# Patient Record
Sex: FEMALE | Race: WHITE | Employment: FULL TIME | URBAN - METROPOLITAN AREA
[De-identification: names, ages, dates, MRNs, and addresses within clinical notes are randomized per-mention and may not be internally consistent; named-entity substitution may affect disease eponyms.]

---

## 2017-04-27 PROBLEM — M67.431 GANGLION CYST OF DORSUM OF RIGHT WRIST: Status: ACTIVE | Noted: 2017-04-27

## 2017-05-03 PROCEDURE — 88304 TISSUE EXAM BY PATHOLOGIST: CPT | Performed by: ORTHOPAEDIC SURGERY

## 2018-08-28 NOTE — PROGRESS NOTES
HPI:   Drew Freitas is a 26year old female here to discuss weight gain and foot pain     On on mobic for chronic right wrist surgery; pt sees dr. Joy Fuller.     Pt reports she had gradual weight gain since 2015- 80-90lbs  Pt has not had labs for thyroi denies depression or anxiety  HEMATOLOGIC: denies hx of anemia  ENDOCRINE: denies thyroid history  ALL/ASTHMA: denies  asthma    EXAM:   /84   Pulse 99   Temp 98.2 °F (36.8 °C) (Oral)   Resp 18   Ht 66\"   Wt 221 lb   LMP 08/23/2018   SpO2 95%   Mary Ann (CPT=[id number]); Future    Questions answered and patient indicates understanding of these issues and agrees to the plan. Follow up in 2-3 months or sooner if needed.

## 2018-08-29 ENCOUNTER — LAB ENCOUNTER (OUTPATIENT)
Dept: LAB | Age: 23
End: 2018-08-29
Attending: FAMILY MEDICINE
Payer: COMMERCIAL

## 2018-08-29 ENCOUNTER — OFFICE VISIT (OUTPATIENT)
Dept: FAMILY MEDICINE CLINIC | Facility: CLINIC | Age: 23
End: 2018-08-29
Payer: COMMERCIAL

## 2018-08-29 ENCOUNTER — HOSPITAL ENCOUNTER (OUTPATIENT)
Dept: GENERAL RADIOLOGY | Age: 23
Discharge: HOME | End: 2018-08-29
Attending: FAMILY MEDICINE
Payer: COMMERCIAL

## 2018-08-29 ENCOUNTER — TELEPHONE (OUTPATIENT)
Dept: FAMILY MEDICINE CLINIC | Facility: CLINIC | Age: 23
End: 2018-08-29

## 2018-08-29 VITALS
HEIGHT: 66 IN | OXYGEN SATURATION: 95 % | WEIGHT: 221 LBS | RESPIRATION RATE: 18 BRPM | TEMPERATURE: 98 F | HEART RATE: 99 BPM | SYSTOLIC BLOOD PRESSURE: 120 MMHG | BODY MASS INDEX: 35.52 KG/M2 | DIASTOLIC BLOOD PRESSURE: 84 MMHG

## 2018-08-29 DIAGNOSIS — M79.672 LEFT FOOT PAIN: ICD-10-CM

## 2018-08-29 DIAGNOSIS — Z00.00 GENERAL MEDICAL EXAM: ICD-10-CM

## 2018-08-29 DIAGNOSIS — R63.5 WEIGHT GAIN: ICD-10-CM

## 2018-08-29 DIAGNOSIS — Z00.00 GENERAL MEDICAL EXAM: Primary | ICD-10-CM

## 2018-08-29 LAB
ALBUMIN SERPL-MCNC: 3.8 G/DL (ref 3.5–4.8)
ALBUMIN/GLOB SERPL: 1.1 {RATIO} (ref 1–2)
ALP LIVER SERPL-CCNC: 85 U/L (ref 52–144)
ALT SERPL-CCNC: 22 U/L (ref 14–54)
ANION GAP SERPL CALC-SCNC: 7 MMOL/L (ref 0–18)
AST SERPL-CCNC: 18 U/L (ref 15–41)
BASOPHILS # BLD AUTO: 0.04 X10(3) UL (ref 0–0.1)
BASOPHILS NFR BLD AUTO: 0.6 %
BILIRUB SERPL-MCNC: 0.5 MG/DL (ref 0.1–2)
BUN BLD-MCNC: 15 MG/DL (ref 8–20)
BUN/CREAT SERPL: 17.2 (ref 10–20)
CALCIUM BLD-MCNC: 9 MG/DL (ref 8.3–10.3)
CHLORIDE SERPL-SCNC: 107 MMOL/L (ref 101–111)
CHOLEST SMN-MCNC: 149 MG/DL (ref ?–200)
CO2 SERPL-SCNC: 27 MMOL/L (ref 22–32)
CREAT BLD-MCNC: 0.87 MG/DL (ref 0.55–1.02)
EOSINOPHIL # BLD AUTO: 0.14 X10(3) UL (ref 0–0.3)
EOSINOPHIL NFR BLD AUTO: 2.2 %
ERYTHROCYTE [DISTWIDTH] IN BLOOD BY AUTOMATED COUNT: 11.9 % (ref 11.5–16)
EST. AVERAGE GLUCOSE BLD GHB EST-MCNC: 100 MG/DL (ref 68–126)
GLOBULIN PLAS-MCNC: 3.5 G/DL (ref 2.5–4)
GLUCOSE BLD-MCNC: 93 MG/DL (ref 70–99)
HAV AB SERPL IA-ACNC: 356 PG/ML (ref 193–986)
HBA1C MFR BLD HPLC: 5.1 % (ref ?–5.7)
HCT VFR BLD AUTO: 41.1 % (ref 34–50)
HDLC SERPL-MCNC: 46 MG/DL (ref 40–59)
HGB BLD-MCNC: 13.5 G/DL (ref 12–16)
IMMATURE GRANULOCYTE COUNT: 0.02 X10(3) UL (ref 0–1)
IMMATURE GRANULOCYTE RATIO %: 0.3 %
LDLC SERPL CALC-MCNC: 88 MG/DL (ref ?–100)
LYMPHOCYTES # BLD AUTO: 1.76 X10(3) UL (ref 0.9–4)
LYMPHOCYTES NFR BLD AUTO: 27.4 %
M PROTEIN MFR SERPL ELPH: 7.3 G/DL (ref 6.1–8.3)
MCH RBC QN AUTO: 29.2 PG (ref 27–33.2)
MCHC RBC AUTO-ENTMCNC: 32.8 G/DL (ref 31–37)
MCV RBC AUTO: 89 FL (ref 81–100)
MONOCYTES # BLD AUTO: 0.55 X10(3) UL (ref 0.1–1)
MONOCYTES NFR BLD AUTO: 8.6 %
NEUTROPHIL ABS PRELIM: 3.92 X10 (3) UL (ref 1.3–6.7)
NEUTROPHILS # BLD AUTO: 3.92 X10(3) UL (ref 1.3–6.7)
NEUTROPHILS NFR BLD AUTO: 60.9 %
NONHDLC SERPL-MCNC: 103 MG/DL (ref ?–130)
OSMOLALITY SERPL CALC.SUM OF ELEC: 293 MOSM/KG (ref 275–295)
PLATELET # BLD AUTO: 269 10(3)UL (ref 150–450)
POTASSIUM SERPL-SCNC: 3.7 MMOL/L (ref 3.6–5.1)
RBC # BLD AUTO: 4.62 X10(6)UL (ref 3.8–5.1)
RED CELL DISTRIBUTION WIDTH-SD: 38.2 FL (ref 35.1–46.3)
SODIUM SERPL-SCNC: 141 MMOL/L (ref 136–144)
T4 FREE SERPL-MCNC: 1.2 NG/DL (ref 0.9–1.8)
TRIGL SERPL-MCNC: 73 MG/DL (ref 30–149)
TSI SER-ACNC: 1.48 MIU/ML (ref 0.35–5.5)
VIT D+METAB SERPL-MCNC: 11.5 NG/ML (ref 30–100)
VLDLC SERPL CALC-MCNC: 15 MG/DL (ref 0–30)
WBC # BLD AUTO: 6.4 X10(3) UL (ref 4–13)

## 2018-08-29 PROCEDURE — 80053 COMPREHEN METABOLIC PANEL: CPT

## 2018-08-29 PROCEDURE — 84443 ASSAY THYROID STIM HORMONE: CPT

## 2018-08-29 PROCEDURE — 80061 LIPID PANEL: CPT

## 2018-08-29 PROCEDURE — 83036 HEMOGLOBIN GLYCOSYLATED A1C: CPT

## 2018-08-29 PROCEDURE — 36415 COLL VENOUS BLD VENIPUNCTURE: CPT

## 2018-08-29 PROCEDURE — 84439 ASSAY OF FREE THYROXINE: CPT

## 2018-08-29 PROCEDURE — 82306 VITAMIN D 25 HYDROXY: CPT

## 2018-08-29 PROCEDURE — 73630 X-RAY EXAM OF FOOT: CPT | Performed by: FAMILY MEDICINE

## 2018-08-29 PROCEDURE — 85025 COMPLETE CBC W/AUTO DIFF WBC: CPT

## 2018-08-29 PROCEDURE — 99203 OFFICE O/P NEW LOW 30 MIN: CPT | Performed by: FAMILY MEDICINE

## 2018-08-29 PROCEDURE — 82607 VITAMIN B-12: CPT

## 2018-08-29 NOTE — TELEPHONE ENCOUNTER
----- Message from Jefferson Banerjee DO sent at 8/29/2018 10:10 AM CDT -----  Normal x-ray   Ok for referral to [address] [place] [address],9D

## 2018-09-05 ENCOUNTER — OFFICE VISIT (OUTPATIENT)
Dept: FAMILY MEDICINE CLINIC | Facility: CLINIC | Age: 23
End: 2018-09-05
Payer: COMMERCIAL

## 2018-09-05 VITALS
OXYGEN SATURATION: 98 % | SYSTOLIC BLOOD PRESSURE: 118 MMHG | HEART RATE: 90 BPM | TEMPERATURE: 99 F | RESPIRATION RATE: 18 BRPM | DIASTOLIC BLOOD PRESSURE: 78 MMHG | WEIGHT: 221 LBS | HEIGHT: 66 IN | BODY MASS INDEX: 35.52 KG/M2

## 2018-09-05 DIAGNOSIS — J02.9 SORE THROAT: ICD-10-CM

## 2018-09-05 DIAGNOSIS — H66.91 ACUTE INFECTION OF RIGHT EAR: Primary | ICD-10-CM

## 2018-09-05 LAB
CONTROL LINE PRESENT WITH A CLEAR BACKGROUND (YES/NO): YES YES/NO
STREP GRP A CUL-SCR: NEGATIVE

## 2018-09-05 PROCEDURE — 87880 STREP A ASSAY W/OPTIC: CPT | Performed by: PHYSICIAN ASSISTANT

## 2018-09-05 PROCEDURE — 99213 OFFICE O/P EST LOW 20 MIN: CPT | Performed by: PHYSICIAN ASSISTANT

## 2018-09-05 RX ORDER — AMOXICILLIN AND CLAVULANATE POTASSIUM 875; 125 MG/1; MG/1
1 TABLET, FILM COATED ORAL 2 TIMES DAILY
Qty: 20 TABLET | Refills: 0 | Status: SHIPPED | OUTPATIENT
Start: 2018-09-05 | End: 2018-09-15

## 2018-09-05 NOTE — PROGRESS NOTES
HPI:   Lux Purvis is a 26year old female who presents for right ear pain and sore throat for  2  days. Patient reports sore throat, ear pain, denies fever, denies cough, denies sinus pain. Sore throat has resolved.    Has taken ibuprofen and sudafe normal S1S2, RRR without murmur     ASSESSMENT AND PLAN:   1. Sore throat  Rapid strep negative  Ibuprofen 600 mg every 6 hours with food as needed for pain  - STREP A ASSAY W/OPTIC    2. Acute infection of right ear  augmentin twice daily for 10 days.  Li Causey

## 2020-08-25 PROCEDURE — 88304 TISSUE EXAM BY PATHOLOGIST: CPT | Performed by: ORTHOPAEDIC SURGERY

## 2020-09-16 ENCOUNTER — TELEMEDICINE (OUTPATIENT)
Dept: FAMILY MEDICINE CLINIC | Facility: CLINIC | Age: 25
End: 2020-09-16

## 2020-09-16 DIAGNOSIS — L30.0 NUMMULAR ECZEMA: Primary | ICD-10-CM

## 2020-09-16 PROCEDURE — 99213 OFFICE O/P EST LOW 20 MIN: CPT | Performed by: FAMILY MEDICINE

## 2020-09-16 NOTE — PROGRESS NOTES
HPI:    Patient ID: Mele Karus is a 26year old female. Rash   This is a new problem. Episode onset: 1.5 weeks ago. The problem has been gradually worsening since onset. The affected locations include the left arm.  The rash is characterized by i External Cream 60 g 0     Sig: Apply topically 2 (two) times daily as needed.        Imaging & Referrals:  None       LO#[id number]

## 2020-11-03 ENCOUNTER — TELEMEDICINE (OUTPATIENT)
Dept: FAMILY MEDICINE CLINIC | Facility: CLINIC | Age: 25
End: 2020-11-03
Payer: COMMERCIAL

## 2020-11-03 DIAGNOSIS — R50.9 FEVER, UNSPECIFIED FEVER CAUSE: Primary | ICD-10-CM

## 2020-11-03 DIAGNOSIS — R06.00 DYSPNEA, UNSPECIFIED TYPE: ICD-10-CM

## 2020-11-03 PROCEDURE — 99213 OFFICE O/P EST LOW 20 MIN: CPT | Performed by: INTERNAL MEDICINE

## 2020-11-03 RX ORDER — PREDNISONE 10 MG/1
TABLET ORAL
Qty: 12 TABLET | Refills: 0 | Status: SHIPPED | OUTPATIENT
Start: 2020-11-03 | End: 2022-01-06

## 2020-11-03 RX ORDER — AZITHROMYCIN 250 MG/1
TABLET, FILM COATED ORAL
Qty: 6 TABLET | Refills: 0 | Status: SHIPPED | OUTPATIENT
Start: 2020-11-03 | End: 2020-11-08

## 2020-11-03 NOTE — PROGRESS NOTES
Virtual/Telephone Check-In    Layton Felix verbally consents to a Virtual/Telephone Check-In service on 11/3/2020  .   Patient understands and accepts financial responsibility for any deductible, co-insurance and/or co-pays associated with this servic pleasant demeanor      ASSESSMENT AND PLAN:   Viral Syndrome  PLAN: Zithromycin z-deven as directed for 5 days and prednisone x 4 days, Covid test.  Isolation precautions discussed.   Drink plenty of clear fluids and get as much rest as possible while you're

## 2020-11-04 ENCOUNTER — APPOINTMENT (OUTPATIENT)
Dept: LAB | Age: 25
End: 2020-11-04
Attending: INTERNAL MEDICINE
Payer: COMMERCIAL

## 2020-11-04 DIAGNOSIS — R50.9 FEVER, UNSPECIFIED FEVER CAUSE: ICD-10-CM

## 2020-11-04 DIAGNOSIS — R06.00 DYSPNEA, UNSPECIFIED TYPE: ICD-10-CM

## 2020-11-09 ENCOUNTER — TELEMEDICINE (OUTPATIENT)
Dept: FAMILY MEDICINE CLINIC | Facility: CLINIC | Age: 25
End: 2020-11-09

## 2020-11-09 DIAGNOSIS — J01.00 ACUTE NON-RECURRENT MAXILLARY SINUSITIS: ICD-10-CM

## 2020-11-09 DIAGNOSIS — U07.1 COVID-19: Primary | ICD-10-CM

## 2020-11-09 PROCEDURE — 99213 OFFICE O/P EST LOW 20 MIN: CPT | Performed by: INTERNAL MEDICINE

## 2020-11-09 RX ORDER — AZITHROMYCIN 250 MG/1
TABLET, FILM COATED ORAL
Qty: 6 TABLET | Refills: 0 | Status: SHIPPED | OUTPATIENT
Start: 2020-11-09 | End: 2020-11-14

## 2020-11-10 NOTE — PROGRESS NOTES
Video Visit  Michael Vogt is a 26year old female.   Patient presents with:  Sick Call: [facility]  + Covid      HPI:   Pt requests a virtual visit due to the Covid pandemic to discuss covid + test.  Pt feeling a little better- symptoms of chest heavine neck or joint pain  NEURO: Denies headaches    EXAM:   GENERAL: well developed, well nourished, NAD. SKIN: No rash visible  HEENT: AT/NC.  Normal lips, gums and teeth; no hyponasal tone on video visit; points to cheekbones and under eyes as her sinus press

## 2020-11-12 ENCOUNTER — VIRTUAL PHONE E/M (OUTPATIENT)
Dept: FAMILY MEDICINE CLINIC | Facility: CLINIC | Age: 25
End: 2020-11-12
Payer: COMMERCIAL

## 2020-11-12 DIAGNOSIS — U07.1 COVID-19: Primary | ICD-10-CM

## 2020-11-12 PROCEDURE — 99213 OFFICE O/P EST LOW 20 MIN: CPT | Performed by: INTERNAL MEDICINE

## 2020-11-12 NOTE — PROGRESS NOTES
Virtual Telephone Check-In    Shannon Adhikari verbally consents to a Virtual/Telephone Check-In visit on 11/12/20. Patient has been referred to the [facility] website at [email or web address] to review the yearly Consent to Treat document.     Patient unde antibiotic;+ drinking plenty of fluids  NEURO:no headaches;no dizziness    EXAM:   There were no vitals taken for this visit.   ENT: no hyponasal tone, no sneezing  LUNGS: speaking in full sentences; no cough; no audible wheeze  PSYCH: A&Ox3;  judgement and

## 2021-12-27 NOTE — PROGRESS NOTES
This visit is conducted using Telemedicine with live, interactive video and audio.     Telehealth outside of [address] Verbal Consent   I conducted a telehealth visit with Arleene Homans today, 12/27/21, which was completed using two-way, real past (pt was on zoloft )   Anxiety has been ok, meds stopped due to SE, pt feels she has good tools after working with a therapist       Focus not well controlled  Sleep overall better but still up late / pt will get stuck on a task and will not go to slee Take 1 tablet (1 mg total) by mouth at bedtime. Dispense: 30 tablet; Refill: 0    2.  General medical exam    - FREE T4 (FREE THYROXINE)  - ASSAY, THYROID STIM HORMONE  - LIPID PANEL  - CBC WITH DIFFERENTIAL WITH PLATELET  - VITAMIN Q18  - COMP METABOLIC P

## 2021-12-30 ENCOUNTER — PATIENT MESSAGE (OUTPATIENT)
Dept: FAMILY MEDICINE CLINIC | Facility: CLINIC | Age: 26
End: 2021-12-30

## 2021-12-30 NOTE — TELEPHONE ENCOUNTER
From: Dayanara Chirinos  To: Geneva Newberry DO  Sent: 12/30/2021 11:24 AM CST  Subject: Psych Report    Per your request, my psychological evaluation from a few weeks ago is attached for review.  Resulting diagnosis were General Anxiety, ADHD, and depressio

## 2022-01-20 ENCOUNTER — OFFICE VISIT (OUTPATIENT)
Dept: FAMILY MEDICINE CLINIC | Facility: CLINIC | Age: 27
End: 2022-01-20
Payer: COMMERCIAL

## 2022-01-20 ENCOUNTER — EKG ENCOUNTER (OUTPATIENT)
Dept: LAB | Age: 27
End: 2022-01-20
Attending: FAMILY MEDICINE
Payer: COMMERCIAL

## 2022-01-20 VITALS
BODY MASS INDEX: 37.93 KG/M2 | SYSTOLIC BLOOD PRESSURE: 124 MMHG | OXYGEN SATURATION: 98 % | HEART RATE: 68 BPM | WEIGHT: 236 LBS | HEIGHT: 66 IN | RESPIRATION RATE: 16 BRPM | DIASTOLIC BLOOD PRESSURE: 72 MMHG

## 2022-01-20 DIAGNOSIS — Z79.899 MEDICATION MANAGEMENT: ICD-10-CM

## 2022-01-20 DIAGNOSIS — E04.9 GOITER: ICD-10-CM

## 2022-01-20 DIAGNOSIS — Z00.00 ANNUAL PHYSICAL EXAM: Primary | ICD-10-CM

## 2022-01-20 DIAGNOSIS — F98.8 ATTENTION DEFICIT DISORDER (ADD) IN ADULT: ICD-10-CM

## 2022-01-20 LAB
ATRIAL RATE: 66 BPM
P AXIS: 30 DEGREES
P-R INTERVAL: 140 MS
Q-T INTERVAL: 390 MS
QRS DURATION: 96 MS
QTC CALCULATION (BEZET): 408 MS
R AXIS: 20 DEGREES
T AXIS: -1 DEGREES
VENTRICULAR RATE: 66 BPM

## 2022-01-20 PROCEDURE — 3008F BODY MASS INDEX DOCD: CPT | Performed by: FAMILY MEDICINE

## 2022-01-20 PROCEDURE — 3074F SYST BP LT 130 MM HG: CPT | Performed by: FAMILY MEDICINE

## 2022-01-20 PROCEDURE — 93010 ELECTROCARDIOGRAM REPORT: CPT | Performed by: INTERNAL MEDICINE

## 2022-01-20 PROCEDURE — 99395 PREV VISIT EST AGE 18-39: CPT | Performed by: FAMILY MEDICINE

## 2022-01-20 PROCEDURE — 93005 ELECTROCARDIOGRAM TRACING: CPT

## 2022-01-20 PROCEDURE — 3078F DIAST BP <80 MM HG: CPT | Performed by: FAMILY MEDICINE

## 2022-01-20 PROCEDURE — 99213 OFFICE O/P EST LOW 20 MIN: CPT | Performed by: FAMILY MEDICINE

## 2022-01-20 RX ORDER — GUANFACINE 1 MG/1
1 TABLET, EXTENDED RELEASE ORAL NIGHTLY
Qty: 30 TABLET | Refills: 0 | Status: SHIPPED | OUTPATIENT
Start: 2022-01-20 | End: 2022-02-19

## 2022-01-20 RX ORDER — LISDEXAMFETAMINE DIMESYLATE 10 MG/1
10 CAPSULE ORAL DAILY
Qty: 30 CAPSULE | Refills: 0 | Status: SHIPPED | OUTPATIENT
Start: 2022-01-20 | End: 2022-02-19

## 2022-01-20 NOTE — PROGRESS NOTES
HPI:   Brent Maddox is a 23year old female who presents for a complete physical exam.       Wt Readings from Last 6 Encounters:  01/20/22 : 236 lb (107 kg)  09/02/20 : 215 lb (97.5 kg)  08/25/20 : 215 lb 12.8 oz (97.9 kg)  07/29/20 : 215 lb (97.5 kg Alcohol and Other Disorders Associated Maternal Grandmother    • Dementia Maternal Grandmother    • Diabetes Maternal Grandfather       Social History:   Social History    Tobacco Use      Smoking status: Never Smoker      Smokeless tobacco: Never Used and self breast exams. Questions answered and patient indicates understanding of these issues and agrees to the plan. Follow up in 1 month or sooner if needed . Kirk Seip is a 23year old female here to discuss ADD.     S/p neuropsychiatr Medication management  - EKG 12-LEAD; Future    4. Attention deficit disorder (ADD) in adult  Add vyvanse / SE profile discussed   - EKG 12-LEAD; Future  - Lisdexamfetamine Dimesylate (VYVANSE) 10 MG Oral Cap; Take 10 mg by mouth daily.   Dispense: 30 capsu

## 2022-01-21 ENCOUNTER — TELEPHONE (OUTPATIENT)
Dept: FAMILY MEDICINE CLINIC | Facility: CLINIC | Age: 27
End: 2022-01-21

## 2022-02-01 ENCOUNTER — HOSPITAL ENCOUNTER (OUTPATIENT)
Dept: ULTRASOUND IMAGING | Age: 27
Discharge: HOME | End: 2022-02-01
Attending: FAMILY MEDICINE
Payer: COMMERCIAL

## 2022-02-01 DIAGNOSIS — E04.9 GOITER: ICD-10-CM

## 2022-02-01 PROCEDURE — 76536 US EXAM OF HEAD AND NECK: CPT | Performed by: FAMILY MEDICINE

## 2022-04-12 ENCOUNTER — TELEPHONE (OUTPATIENT)
Dept: FAMILY MEDICINE CLINIC | Facility: CLINIC | Age: 27
End: 2022-04-12

## 2022-04-12 NOTE — TELEPHONE ENCOUNTER
Lisdexamfetamine Dimesylate (VYVANSE) 20 MG Oral Cap       Key:  [id number]    FAX IN PA TRIAGE FOLDER

## 2022-04-18 RX ORDER — GUANFACINE 1 MG/1
1 TABLET, EXTENDED RELEASE ORAL DAILY
Qty: 30 TABLET | Refills: 0 | Status: SHIPPED | OUTPATIENT
Start: 2022-04-18 | End: 2022-05-18

## 2022-04-19 ENCOUNTER — TELEPHONE (OUTPATIENT)
Dept: FAMILY MEDICINE CLINIC | Facility: CLINIC | Age: 27
End: 2022-04-19

## 2022-04-19 NOTE — TELEPHONE ENCOUNTER
Left pharmacy a message. End of January we PA'd this and it was approved. Told pharmacy to call if they re-run and still having trouble.    APPROVED 2/2/22-2/1/25  Lazarus Ely IN PA TRIAGE FOLDER

## 2022-04-19 NOTE — TELEPHONE ENCOUNTER
STATES THAT THE INSURANCE REQUIRES A PRIOR AUTH FOR VYVANCE. RHODA SPOKE TO INSURANCE AND THEY TELL THEM THERE IS NO PRIOR AUTH ON FILE FOR THIS DRUG. [address] WE TOLD THEM THAT IT WAS AUTH'D BY THE INSURANCE COMPANY. PLS CALL RHODA BACK TO ADVISE.

## 2022-04-19 NOTE — TELEPHONE ENCOUNTER
Called again; on hold over 5 minutes. Sent fax back letting them know we do not use [facility] any longer and I need PA info to call ins.

## 2022-04-21 NOTE — TELEPHONE ENCOUNTER
Pharmacy calling stating we need to call [phone] in regards to PA for vyvanse. Informed him that it was approved but he states that insurance is not showing that it was approved. Please advise.

## 2022-04-22 NOTE — TELEPHONE ENCOUNTER
Called # and got the following message: \"You have dialed a number that is not available from your calling area\"    Sent another fax to Balwinder Webb advising we do not use [facility] so, we can not auth with the key code they gave us and that we need phone # and pt member ID to call Pt's insurance.

## 2022-04-27 NOTE — TELEPHONE ENCOUNTER
Spoke with patient insurance: received a 36 month approval for Vyvanse. Left message with Osmel. My chart sent to patient.

## 2022-05-27 RX ORDER — GUANFACINE 1 MG/1
TABLET, EXTENDED RELEASE ORAL
Qty: 30 TABLET | Refills: 0 | Status: SHIPPED | OUTPATIENT
Start: 2022-05-27

## 2022-06-06 ENCOUNTER — TELEPHONE (OUTPATIENT)
Dept: FAMILY MEDICINE CLINIC | Facility: CLINIC | Age: 27
End: 2022-06-06

## 2022-07-19 ENCOUNTER — HOSPITAL ENCOUNTER (OUTPATIENT)
Dept: NUTRITION | Facility: HOSPITAL | Age: 27
Discharge: HOME | End: 2022-07-19
Attending: FAMILY MEDICINE
Payer: COMMERCIAL

## 2022-07-19 DIAGNOSIS — R63.5 WEIGHT GAIN: ICD-10-CM

## 2022-07-19 PROCEDURE — 97802 MEDICAL NUTRITION INDIV IN: CPT | Performed by: DIETITIAN, REGISTERED

## 2022-10-25 DIAGNOSIS — F98.8 ATTENTION DEFICIT DISORDER (ADD) IN ADULT: ICD-10-CM

## 2022-10-25 NOTE — TELEPHONE ENCOUNTER
lisdexamfetamine (VYVANSE) 20 MG Oral Cap       Pt moved and wants rx resent to :    [facility]  [address] [facility]

## 2022-12-15 ENCOUNTER — WALK IN (OUTPATIENT)
Dept: URGENT CARE | Age: 27
End: 2022-12-15

## 2022-12-15 VITALS
SYSTOLIC BLOOD PRESSURE: 122 MMHG | RESPIRATION RATE: 18 BRPM | DIASTOLIC BLOOD PRESSURE: 80 MMHG | OXYGEN SATURATION: 100 % | TEMPERATURE: 99 F | HEART RATE: 129 BPM

## 2022-12-15 DIAGNOSIS — R50.9 FEVER IN ADULT: Primary | ICD-10-CM

## 2022-12-15 LAB
FLUAV AG UPPER RESP QL IA.RAPID: NEGATIVE
FLUBV AG UPPER RESP QL IA.RAPID: NEGATIVE
SARS-COV+SARS-COV-2 AG RESP QL IA.RAPID: NOT DETECTED
TEST LOT EXPIRATION DATE: NORMAL
TEST LOT NUMBER: NORMAL

## 2022-12-15 PROCEDURE — 87428 SARSCOV & INF VIR A&B AG IA: CPT | Performed by: INTERNAL MEDICINE

## 2022-12-15 PROCEDURE — 99214 OFFICE O/P EST MOD 30 MIN: CPT | Performed by: INTERNAL MEDICINE

## 2022-12-15 RX ORDER — LISDEXAMFETAMINE DIMESYLATE CAPSULES 20 MG/1
20 CAPSULE ORAL
COMMUNITY
Start: 2022-11-16 | End: 2022-12-25

## 2022-12-21 ENCOUNTER — OFFICE VISIT (OUTPATIENT)
Dept: FAMILY MEDICINE CLINIC | Facility: CLINIC | Age: 27
End: 2022-12-21
Payer: COMMERCIAL

## 2022-12-21 VITALS
RESPIRATION RATE: 16 BRPM | HEIGHT: 65.75 IN | DIASTOLIC BLOOD PRESSURE: 84 MMHG | BODY MASS INDEX: 37.08 KG/M2 | SYSTOLIC BLOOD PRESSURE: 114 MMHG | OXYGEN SATURATION: 98 % | WEIGHT: 228 LBS | HEART RATE: 100 BPM

## 2022-12-21 DIAGNOSIS — J01.00 SUBACUTE MAXILLARY SINUSITIS: Primary | ICD-10-CM

## 2022-12-21 PROCEDURE — 3074F SYST BP LT 130 MM HG: CPT | Performed by: FAMILY MEDICINE

## 2022-12-21 PROCEDURE — 3008F BODY MASS INDEX DOCD: CPT | Performed by: FAMILY MEDICINE

## 2022-12-21 PROCEDURE — 3079F DIAST BP 80-89 MM HG: CPT | Performed by: FAMILY MEDICINE

## 2022-12-21 PROCEDURE — 99213 OFFICE O/P EST LOW 20 MIN: CPT | Performed by: FAMILY MEDICINE

## 2022-12-21 RX ORDER — CEFDINIR 300 MG/1
300 CAPSULE ORAL 2 TIMES DAILY
Qty: 20 CAPSULE | Refills: 0 | Status: SHIPPED | OUTPATIENT
Start: 2022-12-21 | End: 2022-12-31

## 2023-02-22 DIAGNOSIS — F98.8 ATTENTION DEFICIT DISORDER (ADD) IN ADULT: ICD-10-CM

## 2023-02-22 RX ORDER — GUANFACINE 1 MG/1
1 TABLET, EXTENDED RELEASE ORAL DAILY
Qty: 90 TABLET | Refills: 0 | Status: SHIPPED | OUTPATIENT
Start: 2023-02-22

## 2023-02-22 NOTE — TELEPHONE ENCOUNTER
lisdexamfetamine (VYVANSE) 20 MG Oral Cap     [facility] [address] [facility] [address], [phone], [phone]

## 2023-03-06 ENCOUNTER — OFFICE VISIT (OUTPATIENT)
Dept: FAMILY MEDICINE CLINIC | Facility: CLINIC | Age: 28
End: 2023-03-06
Payer: COMMERCIAL

## 2023-03-06 VITALS
SYSTOLIC BLOOD PRESSURE: 106 MMHG | RESPIRATION RATE: 16 BRPM | HEART RATE: 86 BPM | HEIGHT: 65.75 IN | BODY MASS INDEX: 37.24 KG/M2 | WEIGHT: 229 LBS | OXYGEN SATURATION: 98 % | DIASTOLIC BLOOD PRESSURE: 74 MMHG

## 2023-03-06 DIAGNOSIS — M54.50 LUMBAR PAIN: ICD-10-CM

## 2023-03-06 DIAGNOSIS — M54.6 THORACIC SPINE PAIN: ICD-10-CM

## 2023-03-06 DIAGNOSIS — F98.8 ATTENTION DEFICIT DISORDER (ADD) IN ADULT: ICD-10-CM

## 2023-03-06 DIAGNOSIS — Z00.00 ANNUAL PHYSICAL EXAM: Primary | ICD-10-CM

## 2023-03-06 PROCEDURE — 99213 OFFICE O/P EST LOW 20 MIN: CPT | Performed by: FAMILY MEDICINE

## 2023-03-06 PROCEDURE — 99395 PREV VISIT EST AGE 18-39: CPT | Performed by: FAMILY MEDICINE

## 2023-03-06 PROCEDURE — 3074F SYST BP LT 130 MM HG: CPT | Performed by: FAMILY MEDICINE

## 2023-03-06 PROCEDURE — 3008F BODY MASS INDEX DOCD: CPT | Performed by: FAMILY MEDICINE

## 2023-03-06 PROCEDURE — 3078F DIAST BP <80 MM HG: CPT | Performed by: FAMILY MEDICINE

## 2023-03-12 LAB
ABSOLUTE BASOPHILS: 26 CELLS/UL (ref 0–200)
ABSOLUTE EOSINOPHILS: 146 CELLS/UL (ref 15–500)
ABSOLUTE LYMPHOCYTES: 2692 CELLS/UL (ref 850–3900)
ABSOLUTE MONOCYTES: 482 CELLS/UL (ref 200–950)
ABSOLUTE NEUTROPHILS: 5255 CELLS/UL (ref 1500–7800)
ALBUMIN/GLOBULIN RATIO: 1.7 (CALC) (ref 1–2.5)
ALBUMIN: 4.4 G/DL (ref 3.6–5.1)
ALKALINE PHOSPHATASE: 76 U/L (ref 31–125)
ALT: 13 U/L (ref 6–29)
AST: 15 U/L (ref 10–30)
BASOPHILS: 0.3 %
BILIRUBIN, TOTAL: 0.6 MG/DL (ref 0.2–1.2)
BUN: 15 MG/DL (ref 7–25)
CALCIUM: 9.7 MG/DL (ref 8.6–10.2)
CARBON DIOXIDE: 29 MMOL/L (ref 20–32)
CHLORIDE: 103 MMOL/L (ref 98–110)
CHOL/HDLC RATIO: 3.1 (CALC)
CHOLESTEROL, TOTAL: 176 MG/DL
CREATININE: 0.71 MG/DL (ref 0.5–0.96)
EGFR: 120 ML/MIN/1.73M2
EOSINOPHILS: 1.7 %
GLOBULIN: 2.6 G/DL (CALC) (ref 1.9–3.7)
GLUCOSE: 88 MG/DL (ref 65–99)
HDL CHOLESTEROL: 56 MG/DL
HEMATOCRIT: 42.3 % (ref 35–45)
HEMOGLOBIN A1C: 5.2 % OF TOTAL HGB
HEMOGLOBIN: 14.4 G/DL (ref 11.7–15.5)
LDL-CHOLESTEROL: 101 MG/DL (CALC)
LYMPHOCYTES: 31.3 %
MCH: 29.4 PG (ref 27–33)
MCHC: 34 G/DL (ref 32–36)
MCV: 86.3 FL (ref 80–100)
MONOCYTES: 5.6 %
MPV: 10 FL (ref 7.5–12.5)
NEUTROPHILS: 61.1 %
NON-HDL CHOLESTEROL: 120 MG/DL (CALC)
PLATELET COUNT: 331 THOUSAND/UL (ref 140–400)
POTASSIUM: 4.3 MMOL/L (ref 3.5–5.3)
PROTEIN, TOTAL: 7 G/DL (ref 6.1–8.1)
RDW: 12 % (ref 11–15)
RED BLOOD CELL COUNT: 4.9 MILLION/UL (ref 3.8–5.1)
SODIUM: 139 MMOL/L (ref 135–146)
T4, FREE: 1.2 NG/DL (ref 0.8–1.8)
TRIGLYCERIDES: 98 MG/DL
TSH: 1.52 MIU/L
VITAMIN B12: 275 PG/ML (ref 200–1100)
VITAMIN D, 25-OH, TOTAL: 18 NG/ML (ref 30–100)
WHITE BLOOD CELL COUNT: 8.6 THOUSAND/UL (ref 3.8–10.8)

## 2023-03-29 ENCOUNTER — WALK IN (OUTPATIENT)
Dept: URGENT CARE | Age: 28
End: 2023-03-29

## 2023-03-29 VITALS — DIASTOLIC BLOOD PRESSURE: 70 MMHG | SYSTOLIC BLOOD PRESSURE: 120 MMHG | TEMPERATURE: 98 F | RESPIRATION RATE: 18 BRPM

## 2023-03-29 DIAGNOSIS — J06.9 URI, ACUTE: ICD-10-CM

## 2023-03-29 DIAGNOSIS — R50.9 FEVER, UNSPECIFIED FEVER CAUSE: Primary | ICD-10-CM

## 2023-03-29 LAB
FLUAV AG UPPER RESP QL IA.RAPID: NEGATIVE
FLUBV AG UPPER RESP QL IA.RAPID: NEGATIVE
INTERNAL PROCEDURAL CONTROLS ACCEPTABLE: YES
S PYO AG THROAT QL IA.RAPID: NEGATIVE
SARS-COV+SARS-COV-2 AG RESP QL IA.RAPID: NOT DETECTED
TEST LOT EXPIRATION DATE: NORMAL
TEST LOT EXPIRATION DATE: NORMAL
TEST LOT NUMBER: NORMAL
TEST LOT NUMBER: NORMAL

## 2023-03-29 PROCEDURE — 87880 STREP A ASSAY W/OPTIC: CPT | Performed by: FAMILY MEDICINE

## 2023-03-29 PROCEDURE — 87428 SARSCOV & INF VIR A&B AG IA: CPT | Performed by: FAMILY MEDICINE

## 2023-03-29 PROCEDURE — 99214 OFFICE O/P EST MOD 30 MIN: CPT | Performed by: FAMILY MEDICINE

## 2023-03-29 RX ORDER — LISDEXAMFETAMINE DIMESYLATE CAPSULES 20 MG/1
20 CAPSULE ORAL
COMMUNITY
Start: 2023-02-27 | End: 2023-03-29

## 2023-03-29 RX ORDER — GUANFACINE 1 MG/1
TABLET, EXTENDED RELEASE ORAL
COMMUNITY
Start: 2023-02-22

## 2023-05-31 ENCOUNTER — OFFICE VISIT (OUTPATIENT)
Dept: FAMILY MEDICINE CLINIC | Facility: CLINIC | Age: 28
End: 2023-05-31
Payer: COMMERCIAL

## 2023-05-31 VITALS
SYSTOLIC BLOOD PRESSURE: 108 MMHG | RESPIRATION RATE: 16 BRPM | HEART RATE: 71 BPM | BODY MASS INDEX: 37.73 KG/M2 | HEIGHT: 65.75 IN | WEIGHT: 232 LBS | OXYGEN SATURATION: 97 % | DIASTOLIC BLOOD PRESSURE: 74 MMHG

## 2023-05-31 DIAGNOSIS — M25.532 LEFT WRIST PAIN: Primary | ICD-10-CM

## 2023-05-31 PROCEDURE — 3074F SYST BP LT 130 MM HG: CPT | Performed by: FAMILY MEDICINE

## 2023-05-31 PROCEDURE — 99213 OFFICE O/P EST LOW 20 MIN: CPT | Performed by: FAMILY MEDICINE

## 2023-05-31 PROCEDURE — 3078F DIAST BP <80 MM HG: CPT | Performed by: FAMILY MEDICINE

## 2023-05-31 PROCEDURE — 3008F BODY MASS INDEX DOCD: CPT | Performed by: FAMILY MEDICINE

## 2023-05-31 RX ORDER — NAPROXEN 500 MG/1
500 TABLET ORAL 2 TIMES DAILY WITH MEALS
Qty: 28 TABLET | Refills: 0 | Status: SHIPPED | OUTPATIENT
Start: 2023-05-31

## 2023-08-24 NOTE — TELEPHONE ENCOUNTER
A refill request was received for:  Requested Prescriptions     Pending Prescriptions Disp Refills    lisdexamfetamine 20 MG Oral Cap  0     Sig: Take 1 capsule (20 mg total) by mouth As Directed.        Last refill date:2/27/2023       Last office visit: 3/6/2023    Follow up due:  Future Appointments   Date Time Provider Caren Benavidez   9/12/2023  5:00 PM Darshan Hdz DO [facility] [address]

## 2023-12-27 NOTE — TELEPHONE ENCOUNTER
.A refill request was received for:  Requested Prescriptions     Pending Prescriptions Disp Refills    lisdexamfetamine 20 MG Oral Cap 30 capsule 0     Sig: Take 1 capsule (20 mg total) by mouth As Directed.        Last refill date:   8/24/2023    Last office visit: 9/12/2023    Follow up due:  Future Appointments   Date Time Provider Caren Benavidez   3/12/2024  5:00 PM Kriste Eisenmenger, DO [facility] [address]

## 2023-12-28 RX ORDER — LISDEXAMFETAMINE DIMESYLATE CAPSULES 20 MG/1
20 CAPSULE ORAL AS DIRECTED
Qty: 30 CAPSULE | Refills: 0 | Status: SHIPPED | OUTPATIENT
Start: 2023-12-28

## 2024-02-07 ENCOUNTER — WALK IN (OUTPATIENT)
Dept: URGENT CARE | Age: 29
End: 2024-02-07

## 2024-02-07 VITALS
RESPIRATION RATE: 16 BRPM | TEMPERATURE: 99.8 F | OXYGEN SATURATION: 100 % | SYSTOLIC BLOOD PRESSURE: 150 MMHG | HEART RATE: 122 BPM | DIASTOLIC BLOOD PRESSURE: 61 MMHG

## 2024-02-07 DIAGNOSIS — Z20.822 CLOSE EXPOSURE TO COVID-19 VIRUS: ICD-10-CM

## 2024-02-07 DIAGNOSIS — R50.9 FEVER AND CHILLS: ICD-10-CM

## 2024-02-07 DIAGNOSIS — U07.1 COVID-19 VIRUS INFECTION: Primary | ICD-10-CM

## 2024-02-07 LAB
FLUAV AG UPPER RESP QL IA.RAPID: NEGATIVE
FLUBV AG UPPER RESP QL IA.RAPID: NEGATIVE
INTERNAL PROCEDURAL CONTROLS ACCEPTABLE: YES
S PYO AG THROAT QL IA.RAPID: NEGATIVE
SARS-COV+SARS-COV-2 AG RESP QL IA.RAPID: NOT DETECTED
SARS-COV-2 E GENE CT RESP QN NAA+PROBE: 29.4
SARS-COV-2 N GENE CT SPEC QN NAA N2: 32.3
SARS-COV-2 RDRP CT RESP QN NAA+PROBE: ABNORMAL
SARS-COV-2 RNA RESP QL NAA+PROBE: DETECTED
SERVICE CMNT-IMP: ABNORMAL
TEST LOT EXPIRATION DATE: NORMAL
TEST LOT EXPIRATION DATE: NORMAL
TEST LOT NUMBER: NORMAL
TEST LOT NUMBER: NORMAL

## 2024-02-07 PROCEDURE — 99214 OFFICE O/P EST MOD 30 MIN: CPT | Performed by: FAMILY MEDICINE

## 2024-02-07 PROCEDURE — 87635 SARS-COV-2 COVID-19 AMP PRB: CPT | Performed by: INTERNAL MEDICINE

## 2024-02-07 PROCEDURE — 87428 SARSCOV & INF VIR A&B AG IA: CPT | Performed by: FAMILY MEDICINE

## 2024-02-07 PROCEDURE — 87880 STREP A ASSAY W/OPTIC: CPT | Performed by: FAMILY MEDICINE

## 2024-02-14 ENCOUNTER — TELEPHONE (OUTPATIENT)
Dept: FAMILY MEDICINE CLINIC | Facility: CLINIC | Age: 29
End: 2024-02-14

## 2024-02-14 DIAGNOSIS — F98.8 ATTENTION DEFICIT DISORDER (ADD) IN ADULT: ICD-10-CM

## 2024-02-14 RX ORDER — LISDEXAMFETAMINE DIMESYLATE CAPSULES 20 MG/1
20 CAPSULE ORAL AS DIRECTED
Qty: 30 CAPSULE | Refills: 0 | Status: SHIPPED | OUTPATIENT
Start: 2024-02-14

## 2024-02-14 NOTE — TELEPHONE ENCOUNTER
Patient called because the pharmacy told her that she could not  her   lisdexamfetamine 20 MG Oral Cap   Because the prescription was closed.    I contacted the pharmacy to see what that meant.  The tech said he did not know why either and sometimes when they are escripted that happens.      Can Dr. Lion please resend script today.  Patient has been going back and forth with this.    Thank you.    [facility] #[id number] - [address] AT [facility] OF [address], [phone], [phone]

## 2024-03-09 NOTE — PROGRESS NOTES
HPI:   Alejandra Islas is a 28 year old female who presents for a complete physical exam.       Wt Readings from Last 6 Encounters:   09/12/23 231 lb (104.8 kg)   05/31/23 232 lb (105.2 kg)   03/06/23 229 lb (103.9 kg)   12/21/22 228 lb (103.4 kg)   09/15/22 226 lb (102.5 kg)   01/20/22 236 lb (107 kg)     There is no height or weight on file to calculate BMI.     Cholesterol, Total (mg/dL)   Date Value   08/29/2018 149     CHOLESTEROL, TOTAL (mg/dL)   Date Value   03/11/2023 176   01/14/2022 187     HDL Cholesterol (mg/dL)   Date Value   08/29/2018 46     HDL CHOLESTEROL (mg/dL)   Date Value   03/11/2023 56   01/14/2022 54     LDL Cholesterol (mg/dL)   Date Value   08/29/2018 88     LDL-CHOLESTEROL (mg/dL (calc))   Date Value   03/11/2023 101 (H)   01/14/2022 113 (H)     AST (U/L)   Date Value   03/11/2023 15   01/14/2022 17   08/29/2018 18     ALT (U/L)   Date Value   03/11/2023 13   01/14/2022 12   08/29/2018 22       Overdue for pap - never had one and still apprehensive   Never had a pap   No vaginal discharge  No bladder dysfunction  Regular menses  + performing self breast exams  last mammogram- never     No calcium and vit D supplementation  No family history of breast colon or ovarian cancer    Chronic back pain     Lab results discussed     Current Outpatient Medications   Medication Sig Dispense Refill    lisdexamfetamine 20 MG Oral Cap Take 1 capsule (20 mg total) by mouth As Directed. 30 capsule 0    naproxen 500 MG Oral Tab Take 1 tablet (500 mg total) by mouth 2 (two) times daily with meals. (Patient not taking: Reported on 9/12/2023) 28 tablet 0    guanFACINE HCl ER 1 MG Oral Tablet 24 Hr Take 1 tablet (1 mg total) by mouth daily. 90 tablet 0    Calcium Carbonate Antacid (TUMS OR) Take by mouth.        Past Medical History:   Diagnosis Date    Mononucleosis       Past Surgical History:   Procedure Laterality Date    CYST REMOVAL  2011, 2013, 2017    OTHER SURGICAL HISTORY      cyst removal     WISDOM TEETH REMOVED  2017      Family History   Problem Relation Age of Onset    Cancer Father         Thyroid    Thyroid Disorder Father     Obesity Mother     Depression Mother     Hypertension Paternal Grandmother     Cancer Paternal Grandmother     Heart Disorder Paternal Grandfather     Cancer Paternal Grandfather     Alcohol and Other Disorders Associated Maternal Grandmother     Dementia Maternal Grandmother     Diabetes Maternal Grandfather       Social History:   Social History     Socioeconomic History    Marital status: Single   Tobacco Use    Smoking status: Never    Smokeless tobacco: Never   Substance and Sexual Activity    Alcohol use: Yes     Comment: Socially    Drug use: No     Occ: in a relationship  // no children   Working full time    Exercise: three times per week.  Diet: watches minimally     REVIEW OF SYSTEMS:   GENERAL: feels well otherwise  SKIN: denies any unusual skin lesions  EYES:denies blurred vision or double vision  HEENT: denies nasal congestion, sinus pain or ST  LUNGS: denies shortness of breath with exertion  CARDIOVASCULAR: denies chest pain on exertion  GI: denies abdominal pain,denies heartburn  : denies dysuria, vaginal discharge or itching  MUSCULOSKELETAL:back pain  NEURO: denies headaches  HEMATOLOGIC: denies hx of anemia  ENDOCRINE: denies thyroid history  ALL/ASTHMA: denies asthma    EXAM:   LMP 08/16/2023 (Approximate)   There is no height or weight on file to calculate BMI.   GENERAL: alert and oriented X 3, well developed, well nourished,in no apparent distress  CARDIO: RRR without murmur  LUNGS: clear to auscultation  NECK: supple,no adenopathy,  no thyromegaly  HEENT: atraumatic, normocephalic,ears and throat are clear  EYES:PERRLA, EOMI, normal,conjunctiva are clear  SKIN: norashes,no suspicious lesions  GI: good BS's,no masses, HSM or tenderness  CHEST: no chest tenderness  MUSCULOSKELETAL: back is not tender,FROM of the back  EXTREMITIES: no cyanosis,  clubbing or edema  NEURO: cranial nerves are intact,motor and sensory are grossly intact    ASSESSMENT AND PLAN:   Alejandra Islas is a 28 year old female who presents with     1. Annual physical exam    - FREE T4 (FREE THYROXINE)  - ASSAY, THYROID STIM HORMONE  - LIPID PANEL  - CBC WITH DIFFERENTIAL WITH PLATELET  - VITAMIN B12  - COMP METABOLIC PANEL (14)  - HEMOGLOBIN A1C  - VITAMIN D, 25-HYDROXY    2. Lumbar pain    - OP REFERRAL TO [facility]    3. Thoracic spine pain    - OP REFERRAL TO [facility]    Discussed lifestyle changes at length     Discussed diet, exercise,calcium, vitamin D, fish oil and self breast exams.   Questions answered and patient indicates understanding of these issues and agrees to the plan.  Follow up in 1 month or sooner if needed .        Alejandra Islas is a 28 year old female here to discuss ADD.    Seeing a therapist   Pt has h/o anxiety, was on meds in the past (pt was on zoloft )   Mood overall well controlled   Still taking guanfacine   No side effects on the vyvanse  More focused at work   no SI/HI  Variable appetite  No anhedonia  No hopelessness  No  depression       HPI:     Current Outpatient Medications   Medication Sig Dispense Refill    lisdexamfetamine 20 MG Oral Cap Take 1 capsule (20 mg total) by mouth As Directed. 30 capsule 0    naproxen 500 MG Oral Tab Take 1 tablet (500 mg total) by mouth 2 (two) times daily with meals. (Patient not taking: Reported on 9/12/2023) 28 tablet 0    guanFACINE HCl ER 1 MG Oral Tablet 24 Hr Take 1 tablet (1 mg total) by mouth daily. 90 tablet 0    Calcium Carbonate Antacid (TUMS OR) Take by mouth.        Past Medical History:   Diagnosis Date    Mononucleosis       Social History:  Social History     Socioeconomic History    Marital status: Single   Tobacco Use    Smoking status: Never    Smokeless tobacco: Never   Substance and Sexual Activity    Alcohol use: Yes     Comment: Socially     Drug use: No        REVIEW OF SYSTEMS:   GENERAL HEALTH: feels well otherwise  SKIN: denies any unusual skin lesions or rashes  RESPIRATORY: denies shortness of breath with exertion  CARDIOVASCULAR: denies chest pain on exertion  GI: denies abdominal pain and denies heartburn  NEURO: denies headaches      EXAM:   LMP 08/16/2023 (Approximate)   GENERAL: well developed, well nourished,in no apparent distress  PSYCH: normal affect, good eye contact, appropriate  NEURO: CN intact    ASSESSMENT AND PLAN:       4. Attention deficit disorder (ADD) in adult  cpm     The patient indicates understanding of these issues and agrees to the plan.  The patient is asked to return in 6 month or sooner if needed.

## 2024-03-12 ENCOUNTER — OFFICE VISIT (OUTPATIENT)
Dept: FAMILY MEDICINE CLINIC | Facility: CLINIC | Age: 29
End: 2024-03-12
Payer: COMMERCIAL

## 2024-05-03 ENCOUNTER — TELEPHONE (OUTPATIENT)
Dept: FAMILY MEDICINE CLINIC | Facility: CLINIC | Age: 29
End: 2024-05-03

## 2024-05-03 NOTE — TELEPHONE ENCOUNTER
Pt would like to speak to a nurse today.   She has not been able to fill the adderall or vyvanse     Please call patient

## 2024-05-07 ENCOUNTER — TELEPHONE (OUTPATIENT)
Dept: FAMILY MEDICINE CLINIC | Facility: CLINIC | Age: 29
End: 2024-05-07

## 2024-05-07 NOTE — TELEPHONE ENCOUNTER
Fax recd from [facility] that Adderall ER 20mg is approved.    5/6/2024 - 5/6/2027

## 2024-11-14 ENCOUNTER — PATIENT MESSAGE (OUTPATIENT)
Dept: FAMILY MEDICINE CLINIC | Facility: CLINIC | Age: 29
End: 2024-11-14

## 2024-11-14 DIAGNOSIS — F98.8 ATTENTION DEFICIT DISORDER (ADD) IN ADULT: ICD-10-CM

## 2024-11-14 DIAGNOSIS — Z79.899 MEDICATION MANAGEMENT: ICD-10-CM

## 2024-11-15 RX ORDER — LISDEXAMFETAMINE DIMESYLATE 20 MG/1
20 CAPSULE ORAL DAILY
Qty: 30 CAPSULE | Refills: 0 | Status: SHIPPED | OUTPATIENT
Start: 2024-11-15 | End: 2024-12-15

## 2024-12-31 ENCOUNTER — WALK IN (OUTPATIENT)
Dept: URGENT CARE | Age: 29
End: 2024-12-31

## 2024-12-31 VITALS
DIASTOLIC BLOOD PRESSURE: 74 MMHG | HEART RATE: 80 BPM | SYSTOLIC BLOOD PRESSURE: 124 MMHG | RESPIRATION RATE: 16 BRPM | TEMPERATURE: 97.9 F | OXYGEN SATURATION: 98 %

## 2024-12-31 DIAGNOSIS — J32.9 SINUSITIS, UNSPECIFIED CHRONICITY, UNSPECIFIED LOCATION: Primary | ICD-10-CM

## 2024-12-31 RX ORDER — LISDEXAMFETAMINE DIMESYLATE 20 MG/1
1 CAPSULE ORAL DAILY
COMMUNITY
Start: 2025-02-09 | End: 2025-03-11

## 2024-12-31 RX ORDER — MELOXICAM 15 MG/1
TABLET ORAL
COMMUNITY

## 2024-12-31 RX ORDER — LISDEXAMFETAMINE DIMESYLATE 20 MG/1
1 CAPSULE ORAL DAILY
COMMUNITY
Start: 2024-12-09 | End: 2025-01-08

## 2025-03-10 ENCOUNTER — OFFICE VISIT (OUTPATIENT)
Dept: FAMILY MEDICINE CLINIC | Facility: CLINIC | Age: 30
End: 2025-03-10
Payer: COMMERCIAL

## 2025-03-10 VITALS
OXYGEN SATURATION: 99 % | WEIGHT: 238 LBS | HEART RATE: 95 BPM | DIASTOLIC BLOOD PRESSURE: 76 MMHG | BODY MASS INDEX: 38.25 KG/M2 | SYSTOLIC BLOOD PRESSURE: 116 MMHG | HEIGHT: 66 IN | RESPIRATION RATE: 16 BRPM

## 2025-03-10 DIAGNOSIS — R06.83 SNORING: ICD-10-CM

## 2025-03-10 DIAGNOSIS — F98.8 ATTENTION DEFICIT DISORDER (ADD) IN ADULT: ICD-10-CM

## 2025-03-10 DIAGNOSIS — Z79.899 MEDICATION MANAGEMENT: ICD-10-CM

## 2025-03-10 DIAGNOSIS — Z00.00 ANNUAL PHYSICAL EXAM: Primary | ICD-10-CM

## 2025-03-10 PROCEDURE — 90471 IMMUNIZATION ADMIN: CPT | Performed by: FAMILY MEDICINE

## 2025-03-10 PROCEDURE — 90715 TDAP VACCINE 7 YRS/> IM: CPT | Performed by: FAMILY MEDICINE

## 2025-03-10 PROCEDURE — 99214 OFFICE O/P EST MOD 30 MIN: CPT | Performed by: FAMILY MEDICINE

## 2025-03-10 PROCEDURE — 99395 PREV VISIT EST AGE 18-39: CPT | Performed by: FAMILY MEDICINE

## 2025-03-10 RX ORDER — LISDEXAMFETAMINE DIMESYLATE 20 MG/1
20 CAPSULE ORAL DAILY
Qty: 30 CAPSULE | Refills: 0 | Status: SHIPPED | OUTPATIENT
Start: 2025-04-10 | End: 2025-05-10

## 2025-03-10 RX ORDER — LISDEXAMFETAMINE DIMESYLATE 20 MG/1
20 CAPSULE ORAL DAILY
Qty: 30 CAPSULE | Refills: 0 | Status: SHIPPED | OUTPATIENT
Start: 2025-03-10 | End: 2025-04-09

## 2025-03-10 RX ORDER — LISDEXAMFETAMINE DIMESYLATE 20 MG/1
20 CAPSULE ORAL DAILY
Qty: 30 CAPSULE | Refills: 0 | Status: SHIPPED | OUTPATIENT
Start: 2025-05-11 | End: 2025-06-10

## 2025-03-10 NOTE — PROGRESS NOTES
HPI:   Alejandra Islas is a 29 year old female who presents for a complete physical exam.       Wt Readings from Last 6 Encounters:   03/10/25 238 lb (108 kg)   12/09/24 234 lb (106.1 kg)   06/17/24 230 lb (104.3 kg)   03/12/24 223 lb (101.2 kg)   09/12/23 231 lb (104.8 kg)   05/31/23 232 lb (105.2 kg)     Body mass index is 38.41 kg/m².     Cholesterol, Total (mg/dL)   Date Value   08/29/2018 149     CHOLESTEROL, TOTAL (mg/dL)   Date Value   03/11/2023 176   01/14/2022 187     HDL Cholesterol (mg/dL)   Date Value   08/29/2018 46     HDL CHOLESTEROL (mg/dL)   Date Value   03/11/2023 56   01/14/2022 54     LDL Cholesterol (mg/dL)   Date Value   08/29/2018 88     LDL-CHOLESTEROL (mg/dL (calc))   Date Value   03/11/2023 101 (H)   01/14/2022 113 (H)     AST (U/L)   Date Value   03/11/2023 15   01/14/2022 17   08/29/2018 18     ALT (U/L)   Date Value   03/11/2023 13   01/14/2022 12   08/29/2018 22       Overdue for pap - never had one and still apprehensive   Never had a pap   No vaginal discharge  No bladder dysfunction  Irregular menses always present // no previous work up   + performing self breast exams  last mammogram- never     No calcium and vit D supplementation  No family history of breast colon or ovarian cancer  P uncle prostate cancer       Current Outpatient Medications   Medication Sig Dispense Refill    lisdexamfetamine (VYVANSE) 20 MG Oral Cap Take 1 capsule (20 mg total) by mouth daily. 30 capsule 0    [START ON 4/10/2025] lisdexamfetamine (VYVANSE) 20 MG Oral Cap Take 1 capsule (20 mg total) by mouth daily. 30 capsule 0    [START ON 5/11/2025] lisdexamfetamine (VYVANSE) 20 MG Oral Cap Take 1 capsule (20 mg total) by mouth daily. 30 capsule 0    guanFACINE 1 MG Oral Tab Take 1 tablet (1 mg total) by mouth nightly. 90 tablet 0    Calcium Carbonate Antacid (TUMS OR) Take by mouth.        Past Medical History:    Mononucleosis      Past Surgical History:   Procedure Laterality Date    Cyst removal   2011, 2013, 2017    Other surgical history      cyst removal    [place] teeth removed  2017      Family History   Problem Relation Age of Onset    Cancer Father         Thyroid    Thyroid Disorder Father     Obesity Mother     Depression Mother     Hypertension Paternal Grandmother     Cancer Paternal Grandmother     Heart Disorder Paternal Grandfather     Cancer Paternal Grandfather     Alcohol and Other Disorders Associated Maternal Grandmother     Dementia Maternal Grandmother     Diabetes Maternal Grandfather       Social History:   Social History     Socioeconomic History    Marital status: Single   Tobacco Use    Smoking status: Never    Smokeless tobacco: Never   Substance and Sexual Activity    Alcohol use: Yes     Comment: Socially    Drug use: No     Social Drivers of Health      Received from [facility], [facility]    Housing Stability     Occ: engaged  Working full time         REVIEW OF SYSTEMS:   GENERAL: feels well otherwise  SKIN: denies any unusual skin lesions  EYES:denies blurred vision or double vision  HEENT: denies nasal congestion, sinus pain or ST  LUNGS: denies shortness of breath with exertion  CARDIOVASCULAR: denies chest pain on exertion  GI: denies abdominal pain,denies heartburn  : denies dysuria, vaginal discharge or itching  MUSCULOSKELETAL:back pain  NEURO: denies headaches  HEMATOLOGIC: denies hx of anemia  ENDOCRINE: denies thyroid history  ALL/ASTHMA: denies asthma    EXAM:   /76   Pulse 95   Resp 16   Ht 5' 6\" (1.676 m)   Wt 238 lb (108 kg)   LMP 01/31/2025 (Approximate)   SpO2 99%   BMI 38.41 kg/m²   Body mass index is 38.41 kg/m².   GENERAL: alert and oriented X 3, well developed, well nourished,in no apparent distress  CARDIO: RRR without murmur  LUNGS: clear to auscultation  NECK: supple,no adenopathy,  no thyromegaly  HEENT: atraumatic, normocephalic,ears and throat are clear  EYES:PERRLA, EOMI, normal,conjunctiva are  clear  SKIN: norashes,no suspicious lesions  GI: good BS's,no masses, HSM or tenderness  CHEST: no chest tenderness  RECTAL : no hemorrhoids noted internal nor external   MUSCULOSKELETAL: back is not tender,FROM of the back  EXTREMITIES: no cyanosis, clubbing or edema  NEURO: cranial nerves are intact,motor and sensory are grossly intact    ASSESSMENT AND PLAN:   Alejandra Islas is a 29 year old female who presents with     1. Annual physical exam    - Hemoglobin A1C  - Comp Metabolic Panel (14)  - Vitamin B12  - CBC With Differential With Platelet  - Lipid Panel  - Assay, Thyroid Stim Hormone  - Free T4, (Free Thyroxine)  - VITAMIN D, 25-HYDROXY [04871][Q]  - 17-OH-Progesterone  - Dehydroepiandrosterone Sulfate  - Prolactin  - Testosterone,Free And Total (Female/Child) [E]    2. Snoring    - OP REFERRAL TO DIAGNOSTIC SLEEP STUDY    Discussed lifestyle changes at length     Discussed diet, exercise,calcium, vitamin D, fish oil and self breast exams.   Questions answered and patient indicates understanding of these issues and agrees to the plan.  Follow up in 6 month or sooner if needed .        Alejandra Islas is a 29 year old female here to discuss ADD.    Seeing a therapist     Mood overall well controlled   Doing well on generic vyvanse and short acting guanfacine   Not depressed   Sleeping better overall   Appetite normal    Concentration has been ok overall    no SI/HI    Patient denies chest pain, shortness of breath, dizziness, and lightheadedness. No exertional symptoms.  No palpitations     ((Did not tolerate adderall / more emotional ))      HPI:     Current Outpatient Medications   Medication Sig Dispense Refill    lisdexamfetamine (VYVANSE) 20 MG Oral Cap Take 1 capsule (20 mg total) by mouth daily. 30 capsule 0    [START ON 4/10/2025] lisdexamfetamine (VYVANSE) 20 MG Oral Cap Take 1 capsule (20 mg total) by mouth daily. 30 capsule 0    [START ON 5/11/2025] lisdexamfetamine (VYVANSE) 20 MG Oral Cap  Take 1 capsule (20 mg total) by mouth daily. 30 capsule 0    guanFACINE 1 MG Oral Tab Take 1 tablet (1 mg total) by mouth nightly. 90 tablet 0    Calcium Carbonate Antacid (TUMS OR) Take by mouth.        Past Medical History:    Mononucleosis      Social History:  Social History     Socioeconomic History    Marital status: Single   Tobacco Use    Smoking status: Never    Smokeless tobacco: Never   Substance and Sexual Activity    Alcohol use: Yes     Comment: Socially    Drug use: No     Social Drivers of Health      Received from [facility], [facility]    Housing Stability        REVIEW OF SYSTEMS:   GENERAL HEALTH: feels well otherwise  SKIN: denies any unusual skin lesions or rashes  RESPIRATORY: denies shortness of breath with exertion  CARDIOVASCULAR: denies chest pain on exertion  GI: denies abdominal pain and denies heartburn  NEURO: denies headaches      EXAM:   /76   Pulse 95   Resp 16   Ht 5' 6\" (1.676 m)   Wt 238 lb (108 kg)   LMP 01/31/2025 (Approximate)   SpO2 99%   BMI 38.41 kg/m²   GENERAL: well developed, well nourished,in no apparent distress  PSYCH: normal affect, good eye contact, appropriate  NEURO: CN intact  No CVA tenderness    ASSESSMENT AND PLAN:       2. Attention deficit disorder (ADD) in adult    - lisdexamfetamine (VYVANSE) 20 MG Oral Cap; Take 1 capsule (20 mg total) by mouth daily.  Dispense: 30 capsule; Refill: 0  - lisdexamfetamine (VYVANSE) 20 MG Oral Cap; Take 1 capsule (20 mg total) by mouth daily.  Dispense: 30 capsule; Refill: 0  - lisdexamfetamine (VYVANSE) 20 MG Oral Cap; Take 1 capsule (20 mg total) by mouth daily.  Dispense: 30 capsule; Refill: 0    3. Medication management    - lisdexamfetamine (VYVANSE) 20 MG Oral Cap; Take 1 capsule (20 mg total) by mouth daily.  Dispense: 30 capsule; Refill: 0  - lisdexamfetamine (VYVANSE) 20 MG Oral Cap; Take 1 capsule (20 mg total) by mouth daily.  Dispense: 30 capsule; Refill: 0  -  lisdexamfetamine (VYVANSE) 20 MG Oral Cap; Take 1 capsule (20 mg total) by mouth daily.  Dispense: 30 capsule; Refill: 0      The patient indicates understanding of these issues and agrees to the plan.  The patient is asked to return in 6 month or sooner if needed.

## 2025-04-15 LAB
17 HYDROXYPROGESTERONE,$/MS/MS: 41 NG/DL
ABSOLUTE BASOPHILS: 50 CELLS/UL (ref 0–200)
ABSOLUTE EOSINOPHILS: 94 CELLS/UL (ref 15–500)
ABSOLUTE LYMPHOCYTES: 2167 CELLS/UL (ref 850–3900)
ABSOLUTE MONOCYTES: 504 CELLS/UL (ref 200–950)
ABSOLUTE NEUTROPHILS: 4385 CELLS/UL (ref 1500–7800)
ALBUMIN/GLOBULIN RATIO: 1.7 (CALC) (ref 1–2.5)
ALBUMIN: 4.5 G/DL (ref 3.6–5.1)
ALKALINE PHOSPHATASE: 72 U/L (ref 31–125)
ALT: 13 U/L (ref 6–29)
AST: 16 U/L (ref 10–30)
BASOPHILS: 0.7 %
BILIRUBIN, TOTAL: 0.8 MG/DL (ref 0.2–1.2)
BUN: 14 MG/DL (ref 7–25)
CALCIUM: 9.4 MG/DL (ref 8.6–10.2)
CARBON DIOXIDE: 28 MMOL/L (ref 20–32)
CHLORIDE: 103 MMOL/L (ref 98–110)
CHOL/HDLC RATIO: 3.1 (CALC)
CHOLESTEROL, TOTAL: 183 MG/DL
CREATININE: 0.8 MG/DL (ref 0.5–0.96)
DHEA SULFATE: 155 MCG/DL (ref 14–349)
EGFR: 102 ML/MIN/1.73M2
EOSINOPHILS: 1.3 %
FREE TESTOSTERONE: 6.8 PG/ML (ref 0.1–6.4)
GLOBULIN: 2.7 G/DL (CALC) (ref 1.9–3.7)
GLUCOSE: 86 MG/DL (ref 65–99)
HDL CHOLESTEROL: 60 MG/DL
HEMATOCRIT: 42.3 % (ref 35–45)
HEMOGLOBIN A1C: 5.3 % OF TOTAL HGB
HEMOGLOBIN: 14.3 G/DL (ref 11.7–15.5)
LDL-CHOLESTEROL: 106 MG/DL (CALC)
LYMPHOCYTES: 30.1 %
MCH: 29 PG (ref 27–33)
MCHC: 33.8 G/DL (ref 32–36)
MCV: 85.8 FL (ref 80–100)
MONOCYTES: 7 %
MPV: 10 FL (ref 7.5–12.5)
NEUTROPHILS: 60.9 %
NON-HDL CHOLESTEROL: 123 MG/DL (CALC)
PLATELET COUNT: 301 THOUSAND/UL (ref 140–400)
POTASSIUM: 4.2 MMOL/L (ref 3.5–5.3)
PROLACTIN: 5.1 NG/ML
PROTEIN, TOTAL: 7.2 G/DL (ref 6.1–8.1)
RDW: 12.1 % (ref 11–15)
RED BLOOD CELL COUNT: 4.93 MILLION/UL (ref 3.8–5.1)
SODIUM: 138 MMOL/L (ref 135–146)
T4, FREE: 1.4 NG/DL (ref 0.8–1.8)
TESTOSTERONE, TOTAL,$/MS/MS: 34 NG/DL (ref 2–45)
TRIGLYCERIDES: 79 MG/DL
TSH: 1.22 MIU/L
VITAMIN B12: 337 PG/ML (ref 200–1100)
VITAMIN D, 25-OH, TOTAL: 17 NG/ML (ref 30–100)
WHITE BLOOD CELL COUNT: 7.2 THOUSAND/UL (ref 3.8–10.8)

## 2025-06-18 ENCOUNTER — OFFICE VISIT (OUTPATIENT)
Dept: OBGYN CLINIC | Facility: CLINIC | Age: 30
End: 2025-06-18
Payer: COMMERCIAL

## 2025-06-18 VITALS
WEIGHT: 232.25 LBS | HEIGHT: 66 IN | BODY MASS INDEX: 37.33 KG/M2 | HEART RATE: 87 BPM | SYSTOLIC BLOOD PRESSURE: 118 MMHG | DIASTOLIC BLOOD PRESSURE: 72 MMHG

## 2025-06-18 DIAGNOSIS — O99.210 OBESITY AFFECTING PREGNANCY, ANTEPARTUM, UNSPECIFIED OBESITY TYPE (HCC): ICD-10-CM

## 2025-06-18 DIAGNOSIS — E28.2 PCOS (POLYCYSTIC OVARIAN SYNDROME): ICD-10-CM

## 2025-06-18 DIAGNOSIS — Z12.4 SCREENING FOR CERVICAL CANCER: Primary | ICD-10-CM

## 2025-06-18 PROCEDURE — 87624 HPV HI-RISK TYP POOLED RSLT: CPT | Performed by: OBSTETRICS & GYNECOLOGY

## 2025-06-18 PROCEDURE — 99385 PREV VISIT NEW AGE 18-39: CPT | Performed by: OBSTETRICS & GYNECOLOGY

## 2025-06-18 PROCEDURE — 99204 OFFICE O/P NEW MOD 45 MIN: CPT | Performed by: OBSTETRICS & GYNECOLOGY

## 2025-06-18 RX ORDER — LISDEXAMFETAMINE DIMESYLATE 20 MG/1
20 CAPSULE ORAL DAILY
COMMUNITY
Start: 2022-01-17

## 2025-06-18 NOTE — PATIENT INSTRUCTIONS
VISIT SUMMARY:    Today, we discussed your concerns about possible polycystic ovary syndrome (PCOS) and reviewed your symptoms and recent test results. We also covered general health maintenance, including the importance of regular cervical cancer screenings.    YOUR PLAN:    POLYCYSTIC OVARY SYNDROME (PCOS): You have irregular menstrual cycles and slightly elevated testosterone levels, which suggest PCOS.  -Consider taking low dose oral progesterone to help regulate your cycles.  -Discuss the option of a hormonal IUD for cycle regulation and symptom management.  -We will refer you to a weight loss center for comprehensive support, including nutrition and lifestyle counseling.  -We will provide information on supplements that may help manage insulin resistance.  - davis-inositol (MI) 2 g daily and D-chiro-inositol (DCI) 600 mg daily supplementation recommended, consider Ovasitol brand     GENERAL HEALTH MAINTENANCE: You have not had a Pap smear before, and it is important to start regular cervical cancer screenings.  -We performed a breast exam and Pap smear today.  -Schedule an annual cervical exam.  -Regular Pap smears are recommended every three years if normal. Starting at age 30, Pap smears with HPV testing will be every five years if results are normal.    FOLLOW-UP: We discussed follow-up options for managing PCOS and general health maintenance.  -Follow up in one year for your annual exam.  -Feel free to send a Worlize message if you wish to discuss further management options or have additional questions.

## 2025-06-18 NOTE — PROGRESS NOTES
[facility]  Obstetrics and Gynecology  History & Physical      The following individual(s) verbally consented to be recorded using ambient AI listening technology and understand that they can each withdraw their consent to this listening technology at any point by asking the clinician to turn off or pause the recording:    Name: Alejandra Islas    CC: Routine annual examination     Subjective:     HPI: Alejandra Islas is a 30 year old  female here for a routine annual exam.     History of Present Illness  Alejandra Islas is a 30 year old female who presents with concerns about possible polycystic ovary syndrome (PCOS).    She is concerned about possible PCOS due to elevated testosterone levels noted in recent test results. She has not seen a gynecologist in ten years and is seeking evaluation for her symptoms.    Her menstrual cycles are irregular, with cycles ranging from six to eleven per year. Menarche occurred at age 13 or 14, and her cycles have been irregular since age 17. She sometimes skips periods, with significant gaps, such as not having a period in  until May. No monthly cramping, breast pain, or migraines associated with her cycles.    She has not used contraceptives due to concerns about interactions with her diagnosed anxiety, depression, and ADHD, which she is actively managing. She has not tracked her cycles consistently since 2018, having previously used an bronson and paper tracking.    She mentions a history of significant weight loss due to illness, which made it difficult for her to eat. She acknowledges a sedentary lifestyle due to her job but is interested in improving her physical activity.    No abnormal discharge or symptoms suggestive of a yeast infection. She has not had a Pap smear before this visit.      OB History:  OB History    Para Term  AB Living   0 0 0 0 0 0   SAB IAB Ectopic Multiple Live Births   0 0 0 0 0       Gyne  History:  Menarche: [age]  Period Cycle (Days): 35 days  Period Duration (Days): 5-6 days  Period Flow: moderate  Use of Birth Control (if yes, specify type): None  Pap Result Notes: Pt has never had a pap  Patient's last menstrual period was 05/28/2025 (approximate).    Pap smear history:  Not on file    Denies history of STDs (non-HPV).   Currently sexually active   Birth control: none      Meds:  Medications Ordered Prior to Encounter[1]    All:  Allergies[2]    PMH:  Past Medical History[3]    Immunization History:   Immunization History   Administered Date(s) Administered    Covid-19 Vaccine Pfizer 30 mcg/0.3 ml 03/18/2021, 04/15/2021, 10/25/2021    DTAP 05/23/1996, 05/23/1996, 07/25/1996, 07/25/1996, 10/04/1996, 10/04/1996, 07/31/1997, 07/31/1997    Hep B, Unspecified Formulation 03/22/1996, 04/26/1996, 10/04/1996    Hib, Unspecified Formulation 05/23/1996, 07/25/1996, 10/04/1996, 07/31/1997    MMR 03/19/1997    TDAP 03/10/2025    Varicella 11/03/1997       PSH:  Past Surgical History[4]    Social History:  Social History     Socioeconomic History    Marital status: Single     Spouse name: Not on file    Number of children: Not on file    Years of education: Not on file    Highest education level: Not on file   Occupational History    Not on file   Tobacco Use    Smoking status: Never    Smokeless tobacco: Never   Vaping Use    Vaping status: Some Days    Substances: THC   Substance and Sexual Activity    Alcohol use: Yes     Comment: Socially    Drug use: No    Sexual activity: Yes     Partners: Female   Other Topics Concern    Caffeine Concern Not Asked    Exercise Not Asked    Seat Belt Not Asked    Special Diet Not Asked    Stress Concern Not Asked    Weight Concern Not Asked   Social History Narrative    Not on file     Social Drivers of Health     Food Insecurity: Not on file   Transportation Needs: Not on file   Stress: Not on file   Housing Stability: Low Risk  (11/6/2023)    [facility] from [facility]    Housing Stability     Mortgage Payment Concerns?: Not on file     Number of Places Lived in the Last Year: Not on file     Unstable Housing?: Not on file         Patient feels unsafe or threatened?: NO    Abuse: None (physical, sexual or mental)    Family History:  Family History[5]  Breast Cancer-related family history includes Cancer in her father, paternal grandfather, and paternal grandmother.    Health maintenance:  Mammogram (age 40 and q1-2yr): - - N/A  Colonoscopy (age 45 and q10yr): -N/A    Review of Systems:  General: no complaints per category. See HPI for additional information.   Breast: no complaints per category. See HPI for additional information.   Respiratory: no complaints per category. See HPI for additional information.   Cardiovascular: no complaints per category. See HPI for additional information.   GI: no complaints per category. See HPI for additional information.   : no complaints per category. See HPI for additional information.   Heme: no complaints per category. See HPI for additional information.       Objective:     Vitals:    06/18/25 0953   BP: 118/72   Pulse: 87   Weight: 232 lb 4 oz (105.3 kg)   Height: 66\"         Body mass index is 37.49 kg/m².    General: AAO.NAD.   CVS exam: normal peripheral perfusion  Chest: non-labored breathing, no tachypnea   Breast: symmetric, no dominant or suspicious mass, no skin or nipple changes and no axillary adenopathy  Abdominal exam: soft, nontender, nondistended  Pelvic exam:   VULVA: normal appearing vulva with no masses, tenderness or lesions  PERINEUM:  normal appearing, no lesions   URETHRAL MEATUS:  normal appearing, no lesions   VAGINA: normal appearing vagina with normal color and discharge, no lesions  CERVIX: normal appearing cervix without discharge or lesions  UTERUS: uterus is normal size, shape, consistency and nontender  ADNEXA: normal adnexa in size, nontender and no masses  PERIRECTAL: normal  appearing, no lesions   Ext: non-tender, no edema       Assessment:     Alejandra Islas is a 30 year old  female here for a well women exam.     Plan:     Problem List Items Addressed This Visit    None  Visit Diagnoses         Screening for cervical cancer    -  Primary    Relevant Orders    ThinPrep PAP Smear    Hpv Dna  High Risk , Thin Prep Collect      PCOS (polycystic ovarian syndrome)        Relevant Orders    [facility] - Ani Woo PA-C [id number] [place]      Obesity affecting pregnancy, antepartum, unspecified obesity type (HCC)        Relevant Orders    [facility] - Ani Woo PA-C [id number] [place]            Assessment & Plan  Polycystic Ovary Syndrome (PCOS)  PCOS diagnosis based on irregular menstrual cycles and slightly elevated testosterone levels (6.8, upper limit 6.4). She experiences 6-11 cycles per year, with some years having as few as 6 cycles. No significant cramping or mastalgia associated with cycles. She is not using contraceptives due to concerns about mood effects. Insulin resistance's role in PCOS and its impact on weight loss was discussed. She plans to conceive in about two years. Management options include observation, oral progesterone, and hormonal IUD. Emphasized weight management's importance in improving symptoms and potential fertility outcomes. A 10% reduction in body weight can trigger natural ovulation. Benefits of weightlifting and protein intake for weight management were discussed. Hormonal IUD may offer more freedom and fewer mood effects compared to oral contraceptives. Progesterone-only pills may avoid mood effects associated with combined pills. Consideration of hormonal IUD placement in the office or under IV sedation if needed.  - Consider low dose oral progesterone for cycle regulation.  - Discuss hormonal IUD for cycle regulation and symptom management.  - Refer  to weight loss center for comprehensive weight management support, including nutrition and lifestyle counseling.  - Provide information on supplements that may aid in insulin resistance management.    General Health Maintenance  She has not had a Pap smear before. Discussed the importance of regular cervical cancer screening. She is 30 years old, and Pap smears are recommended every three years if normal. Explained that Pap smears will be every five years with HPV testing starting at age 30 if results are normal.  - Perform breast exam and Pap smear.  - Schedule annual cervical exam.  - Educate on the importance of regular Pap smears with HPV testing starting at age 30.  - encouraged to maintain weight at healthy BMI  - discussed importance of exercise and healthy eating  - self breast exam instructions provided  - bilateral screening mammogram recommendations discussed - start at age 40   - d/w patient colon cancer screening - start at age 45    Follow-up  Discussed follow-up options for further management of PCOS and general health maintenance. She is encouraged to consider management options and reach out via nPicker for further discussion or appointments.  - Follow up in one year for annual exam.  - Encourage her to send a nPicker message if she wishes to discuss further management options or has additional questions.    All of the findings and plan were discussed with the patient.  She notes understanding and agrees with the plan of care.  All questions were answered to the best of my ability at this time.    RTC in 1 year for an annual exam or sooner if needed to discuss medication management for PCOS    Shelley Lau MD   [facility] - OBGYN      Note to patient and family   The 21st Century Cures Act makes medical notes available to patients in the interest of transparency.  However, please be advised that this is a medical document.  It is intended as [id number] communication.  It is written and medical language  may contain abbreviations or verbiage that are technical and unfamiliar.  It may appear blunt or direct.  Medical documents are intended to carry relevant information, facts as evident, and the clinical opinion of the practitioner.         This note could include assistance by Dragon voice recognition. Errors in content may be related to improper recognition by the system; efforts to review and correct have been done but errors may still exist.          [1]   Current Outpatient Medications on File Prior to Visit   Medication Sig Dispense Refill    lisdexamfetamine (VYVANSE) 20 MG Oral Cap Take 1 capsule (20 mg total) by mouth daily.      guanFACINE 1 MG Oral Tab Take 1 tablet (1 mg total) by mouth nightly. 90 tablet 0    Calcium Carbonate Antacid (TUMS OR) Take by mouth.       No current facility-administered medications on file prior to visit.   [2]   Allergies  Allergen Reactions    Adhesive Tape HIVES    [place] RASH   [3]   Past Medical History:   Mononucleosis   [4]   Past Surgical History:  Procedure Laterality Date    Cyst removal  2011, 2013, 2017    Other surgical history      cyst removal    [place] teeth removed  2017   [5]   Family History  Problem Relation Age of Onset    Cancer Father         Thyroid    Thyroid Disorder Father     Obesity Mother     Depression Mother     Hypertension Paternal Grandmother     Cancer Paternal Grandmother     Heart Disorder Paternal Grandfather     Cancer Paternal Grandfather     Alcohol and Other Disorders Associated Maternal Grandmother     Dementia Maternal Grandmother     Diabetes Maternal Grandfather

## 2025-06-19 LAB — HPV E6+E7 MRNA CVX QL NAA+PROBE: NEGATIVE

## 2025-07-23 ENCOUNTER — TELEPHONE (OUTPATIENT)
Dept: FAMILY MEDICINE CLINIC | Facility: CLINIC | Age: 30
End: 2025-07-23

## 2025-07-24 DIAGNOSIS — F98.8 ATTENTION DEFICIT DISORDER (ADD) IN ADULT: ICD-10-CM

## 2025-07-25 RX ORDER — LISDEXAMFETAMINE DIMESYLATE 20 MG/1
20 CAPSULE ORAL DAILY
Qty: 30 CAPSULE | Refills: 0 | Status: SHIPPED | OUTPATIENT
Start: 2025-07-25